# Patient Record
Sex: MALE | Race: WHITE | NOT HISPANIC OR LATINO | Employment: FULL TIME | URBAN - METROPOLITAN AREA
[De-identification: names, ages, dates, MRNs, and addresses within clinical notes are randomized per-mention and may not be internally consistent; named-entity substitution may affect disease eponyms.]

---

## 2017-09-18 LAB
ALT SERPL-CCNC: 19 U/L (ref 8–45)
AST SERPL-CCNC: 13 U/L (ref 5–41)
CHOLEST SERPL-MCNC: 298 MG/DL (ref 0–200)
CREAT SERPL-MCNC: 0.9 MG/DL (ref 0.72–1.25)
GFR SERPL CREATININE-BSD FRML MDRD: >60 ML/MIN/1.73M2
GLUCOSE SERPL-MCNC: 189 MG/DL (ref 70–100)
HBA1C MFR BLD: 7.9 % (ref 0–5.7)
HDLC SERPL-MCNC: 61 MG/DL
LDLC SERPL CALC-MCNC: 190 MG/DL (ref 0–159)
POTASSIUM SERPL-SCNC: 4.2 MMOL/L (ref 3.5–5.1)
TRIGL SERPL-MCNC: 233 MG/DL (ref 30–150)

## 2017-09-22 LAB — HEP C HIM: NORMAL

## 2017-12-20 ENCOUNTER — TRANSFERRED RECORDS (OUTPATIENT)
Dept: HEALTH INFORMATION MANAGEMENT | Facility: CLINIC | Age: 57
End: 2017-12-20

## 2017-12-20 LAB — HBA1C MFR BLD: 7.9 % (ref 0–5.7)

## 2018-08-23 ENCOUNTER — TRANSFERRED RECORDS (OUTPATIENT)
Dept: HEALTH INFORMATION MANAGEMENT | Facility: CLINIC | Age: 58
End: 2018-08-23

## 2018-08-23 LAB
CHOLEST SERPL-MCNC: 304 MG/DL (ref 0–200)
HBA1C MFR BLD: 9.8 % (ref 0–5.7)
HDLC SERPL-MCNC: 57 MG/DL
LDLC SERPL CALC-MCNC: 207 MG/DL (ref 0–159)
TRIGL SERPL-MCNC: 199 MG/DL (ref 30–150)

## 2018-08-28 ENCOUNTER — TRANSFERRED RECORDS (OUTPATIENT)
Dept: HEALTH INFORMATION MANAGEMENT | Facility: CLINIC | Age: 58
End: 2018-08-28

## 2018-12-21 ENCOUNTER — TRANSFERRED RECORDS (OUTPATIENT)
Dept: HEALTH INFORMATION MANAGEMENT | Facility: CLINIC | Age: 58
End: 2018-12-21

## 2019-01-29 ENCOUNTER — TELEPHONE (OUTPATIENT)
Dept: FAMILY MEDICINE | Facility: CLINIC | Age: 59
End: 2019-01-29

## 2019-01-29 ENCOUNTER — OFFICE VISIT (OUTPATIENT)
Dept: FAMILY MEDICINE | Facility: CLINIC | Age: 59
End: 2019-01-29
Payer: COMMERCIAL

## 2019-01-29 VITALS
HEIGHT: 69 IN | BODY MASS INDEX: 31.99 KG/M2 | WEIGHT: 216 LBS | DIASTOLIC BLOOD PRESSURE: 80 MMHG | SYSTOLIC BLOOD PRESSURE: 120 MMHG | HEART RATE: 69 BPM | TEMPERATURE: 97.2 F | OXYGEN SATURATION: 93 % | RESPIRATION RATE: 16 BRPM

## 2019-01-29 DIAGNOSIS — Z12.5 SCREENING FOR PROSTATE CANCER: ICD-10-CM

## 2019-01-29 DIAGNOSIS — I15.9 SECONDARY HYPERTENSION: ICD-10-CM

## 2019-01-29 DIAGNOSIS — E11.9 TYPE 2 DIABETES MELLITUS WITHOUT COMPLICATION, UNSPECIFIED WHETHER LONG TERM INSULIN USE (H): ICD-10-CM

## 2019-01-29 DIAGNOSIS — L40.9 PSORIASIS: ICD-10-CM

## 2019-01-29 DIAGNOSIS — Z23 NEED FOR PROPHYLACTIC VACCINATION AND INOCULATION AGAINST INFLUENZA: ICD-10-CM

## 2019-01-29 DIAGNOSIS — E78.5 HYPERLIPIDEMIA, UNSPECIFIED HYPERLIPIDEMIA TYPE: ICD-10-CM

## 2019-01-29 DIAGNOSIS — E11.9 TYPE 2 DIABETES MELLITUS WITHOUT COMPLICATION, WITHOUT LONG-TERM CURRENT USE OF INSULIN (H): Primary | ICD-10-CM

## 2019-01-29 DIAGNOSIS — E29.1 HYPOGONADISM MALE: ICD-10-CM

## 2019-01-29 LAB
ALBUMIN SERPL-MCNC: 3.9 G/DL (ref 3.4–5)
ALP SERPL-CCNC: 139 U/L (ref 40–150)
ALT SERPL W P-5'-P-CCNC: 77 U/L (ref 0–70)
ANION GAP SERPL CALCULATED.3IONS-SCNC: 6 MMOL/L (ref 3–14)
AST SERPL W P-5'-P-CCNC: 32 U/L (ref 0–45)
BILIRUB SERPL-MCNC: 0.9 MG/DL (ref 0.2–1.3)
BUN SERPL-MCNC: 13 MG/DL (ref 7–30)
CALCIUM SERPL-MCNC: 8.8 MG/DL (ref 8.5–10.1)
CHLORIDE SERPL-SCNC: 98 MMOL/L (ref 94–109)
CHOLEST SERPL-MCNC: 190 MG/DL
CO2 SERPL-SCNC: 29 MMOL/L (ref 20–32)
CREAT SERPL-MCNC: 0.75 MG/DL (ref 0.66–1.25)
GFR SERPL CREATININE-BSD FRML MDRD: >90 ML/MIN/{1.73_M2}
GLUCOSE SERPL-MCNC: 358 MG/DL (ref 70–99)
HBA1C MFR BLD: 13.6 % (ref 0–5.6)
HDLC SERPL-MCNC: 44 MG/DL
LDLC SERPL CALC-MCNC: 99 MG/DL
NONHDLC SERPL-MCNC: 146 MG/DL
POTASSIUM SERPL-SCNC: 4.3 MMOL/L (ref 3.4–5.3)
PROT SERPL-MCNC: 8.1 G/DL (ref 6.8–8.8)
PSA SERPL-ACNC: 0.37 UG/L (ref 0–4)
SODIUM SERPL-SCNC: 133 MMOL/L (ref 133–144)
TRIGL SERPL-MCNC: 234 MG/DL
TSH SERPL DL<=0.005 MIU/L-ACNC: 2.78 MU/L (ref 0.4–4)

## 2019-01-29 PROCEDURE — 83036 HEMOGLOBIN GLYCOSYLATED A1C: CPT | Performed by: NURSE PRACTITIONER

## 2019-01-29 PROCEDURE — G0103 PSA SCREENING: HCPCS | Performed by: NURSE PRACTITIONER

## 2019-01-29 PROCEDURE — 90471 IMMUNIZATION ADMIN: CPT | Performed by: NURSE PRACTITIONER

## 2019-01-29 PROCEDURE — 84270 ASSAY OF SEX HORMONE GLOBUL: CPT | Performed by: NURSE PRACTITIONER

## 2019-01-29 PROCEDURE — 84403 ASSAY OF TOTAL TESTOSTERONE: CPT | Performed by: NURSE PRACTITIONER

## 2019-01-29 PROCEDURE — 36415 COLL VENOUS BLD VENIPUNCTURE: CPT | Performed by: NURSE PRACTITIONER

## 2019-01-29 PROCEDURE — 80053 COMPREHEN METABOLIC PANEL: CPT | Performed by: NURSE PRACTITIONER

## 2019-01-29 PROCEDURE — 99204 OFFICE O/P NEW MOD 45 MIN: CPT | Mod: 25 | Performed by: NURSE PRACTITIONER

## 2019-01-29 PROCEDURE — 90682 RIV4 VACC RECOMBINANT DNA IM: CPT | Performed by: NURSE PRACTITIONER

## 2019-01-29 PROCEDURE — 84443 ASSAY THYROID STIM HORMONE: CPT | Performed by: NURSE PRACTITIONER

## 2019-01-29 PROCEDURE — 80061 LIPID PANEL: CPT | Performed by: NURSE PRACTITIONER

## 2019-01-29 RX ORDER — CLOBETASOL PROPIONATE 0.5 MG/ML
SOLUTION TOPICAL 2 TIMES DAILY
Qty: 50 ML | Refills: 3 | Status: SHIPPED | OUTPATIENT
Start: 2019-01-29

## 2019-01-29 RX ORDER — CLOBETASOL PROPIONATE 0.5 MG/G
OINTMENT TOPICAL
COMMUNITY
Start: 2017-09-21 | End: 2019-05-01

## 2019-01-29 RX ORDER — CLOBETASOL PROPIONATE 0.5 MG/ML
SOLUTION TOPICAL
COMMUNITY
Start: 2018-08-23 | End: 2019-01-29

## 2019-01-29 RX ORDER — ATORVASTATIN CALCIUM 80 MG/1
80 TABLET, FILM COATED ORAL DAILY
Refills: 0 | COMMUNITY
Start: 2019-01-27 | End: 2019-07-23

## 2019-01-29 RX ORDER — AMLODIPINE BESYLATE 5 MG/1
5 TABLET ORAL DAILY
Refills: 1 | COMMUNITY
Start: 2019-01-02 | End: 2019-06-29

## 2019-01-29 ASSESSMENT — MIFFLIN-ST. JEOR: SCORE: 1780.15

## 2019-01-29 NOTE — PATIENT INSTRUCTIONS
Call your insurance to see about shingles shot.  Will be notified of pending labs.  Follow up with endocrinology.  Continue with same medication.  Flu shot given today.  Follow up in 3 months.    Our Clinic hours are:  Mondays    7:20 am - 7 pm  Tues -  Fri  7:20 am - 5 pm    Clinic Phone: [phone] Call and ask for Jaqueline Culp's care team at the [facility] once to the care team ask to speak to Jaqueline Chester's medical assistant of leave a message for her.    The clinic lab opens at 7:30 am Mon - Fri and appointments are required.    [facility] [place]  Ph. [phone]  Monday  8 am - 7pm  Tues - Fri 8 am - 5:30 pm

## 2019-01-29 NOTE — PROGRESS NOTES
SUBJECTIVE:   Remigio Almaraz is a 58 year old male who presents to clinic today for the following health issues:      New Patient/Transfer of Care  Diabetes Follow-up      Patient is checking blood sugars: not at all    Diabetic concerns: None     Symptoms of hypoglycemia (low blood sugar): none     Paresthesias (numbness or burning in feet) or sores: No     Date of last diabetic eye exam: 2 years ago    BP Readings from Last 2 Encounters:   01/29/19 120/80     No results found for: A1C, LDL    Diabetes Management Resources    Amount of exercise or physical activity: None    Problems taking medications regularly: No    Medication side effects: none    Diet: regular (no restrictions)            Problem list and histories reviewed & adjusted, as indicated.  Additional history: new patient to this clinic, he had been living in [place], [place] and most recently in [place].  He has a history of diabetes and doesn't seem to remember exactly what the dose of his metformin is he's been taking. Denies any concerns with his diabetes.  States he had a colonoscopy while in [place] and it was about 5 years ago and it was normal.  Reports hypogonadism and had been on weekly shots of testosterone. He would like a referral to restart that if needed.   Blood pressure is controlled. History of hyperlipidemia.  He denies any acute health concerns today.  Will get flu shot.      Patient Active Problem List   Diagnosis     Type II diabetes mellitus (H)     Psoriasis     Hypogonadism male     Hypertension     Hyperlipidemia     Past Surgical History:   Procedure Laterality Date     HERNIA REPAIR, UMBILICAL         Social History     Tobacco Use     Smoking status: Never Smoker     Smokeless tobacco: Never Used   Substance Use Topics     Alcohol use: Yes     Comment: 3 per week     Family History   Problem Relation Age of Onset     Breast Cancer Mother      Coronary Artery Disease Father         virus in heart     Coronary Artery Disease  "Sister      Breast Cancer Sister          Current Outpatient Medications   Medication Sig Dispense Refill     amLODIPine (NORVASC) 5 MG tablet Take 5 mg by mouth daily  1     atorvastatin (LIPITOR) 80 MG tablet Take 80 mg by mouth daily  0     clobetasol (TEMOVATE) 0.05 % external ointment        clobetasol (TEMOVATE) 0.05 % external solution Apply topically 2 times daily 50 mL 3     metFORMIN (GLUCOPHAGE) 1000 MG tablet Take 1,000 mg by mouth daily (with breakfast)  0     No Known Allergies  Recent Labs   Lab Test 01/29/19  0938   A1C 13.6*      BP Readings from Last 3 Encounters:   01/29/19 120/80    Wt Readings from Last 3 Encounters:   01/29/19 98 kg (216 lb)                    Reviewed and updated as needed this visit by clinical staff  Tobacco  Allergies  Meds  Med Hx  Surg Hx  Fam Hx  Soc Hx      Reviewed and updated as needed this visit by Provider          ROS: 10 point ROS neg other than the symptoms noted above in the HPI.    OBJECTIVE:     /80 (BP Location: Right arm, Patient Position: Chair, Cuff Size: Adult Large)   Pulse 69   Temp 97.2  F (36.2  C) (Oral)   Resp 16   Ht 1.753 m (5' 9\")   Wt 98 kg (216 lb)   SpO2 93%   BMI 31.90 kg/m    Body mass index is 31.9 kg/m .  GENERAL: healthy, alert and no distress  HENT: ear canals and TM's normal, pharynx without erythema  NECK: no adenopathy, no asymmetry  RESP: lungs clear to auscultation - no rales, rhonchi or wheezes  CV: regular rate and rhythm, normal S1 S2, no S3 or S4, no murmur  ABDOMEN: soft, nontender, no hepatosplenomegaly, no masses and bowel sounds normal  MS: no gross musculoskeletal defects noted      Diagnostic Test Results:  Results for orders placed or performed in visit on 01/29/19 (from the past 24 hour(s))   Hemoglobin A1c   Result Value Ref Range    Hemoglobin A1C 13.6 (H) 0 - 5.6 %       ASSESSMENT/PLAN:             1. Type 2 diabetes mellitus without complication, without long-term current use of insulin (H)      - " Hemoglobin A1c  - DIABETES EDUCATOR REFERRAL  Not well controlled, to diabetes education and he will clarify dose of metformin.        3. Psoriasis  - clobetasol (TEMOVATE) 0.05 % external solution; Apply topically 2 times daily  Dispense: 50 mL; Refill: 3  Stable, refilled his solution.    4. Hypogonadism male    - **Testosterone Free and Total FUTURE anytime  - ENDOCRINOLOGY ADULT REFERRAL    5. Secondary hypertension    - Comprehensive metabolic panel  - TSH with free T4 reflex    6. Hyperlipidemia, unspecified hyperlipidemia type    - Comprehensive metabolic panel  - Lipid panel reflex to direct LDL Fasting    7. Screening for prostate cancer    - PSA, screen    8. Need for prophylactic vaccination and inoculation against influenza    - FLU VACCINE, (RIV4) RECOMBINANT HA  , IM (FluBlok, egg free) [75141]- >18 YRS (FMG recommended  50-64 YRS)  - Vaccine Administration, Initial [31093]    See Patient Instructions  Patient Instructions   Call your insurance to see about shingles shot.  Will be notified of pending labs.  Follow up with endocrinology.  Continue with same medication.  Flu shot given today.  Follow up in 3 months.    Our Clinic hours are:  Mondays    7:20 am - 7 pm  Tues -  Fri  7:20 am - 5 pm    Clinic Phone: [phone] Call and ask for Jaqueline Culp's care team at the [facility] once to the care team ask to speak to Jaqueline Chester's medical assistant of leave a message for her.    The clinic lab opens at 7:30 am Mon - Fri and appointments are required.    [facility] [place]  Ph. [phone]  Monday  8 am - 7pm  Tues - Fri 8 am - 5:30 pm             [facility]    Injectable Influenza Immunization Documentation    1.  Is the person to be vaccinated sick today?   No    2. Does the person to be vaccinated have an allergy to a component   of the vaccine?   No  Egg Allergy Algorithm Link    3. Has the person to be vaccinated ever had a  serious reaction   to influenza vaccine in the past?   No    4. Has the person to be vaccinated ever had Guillain-Barré syndrome?   No    Form completed by

## 2019-01-31 LAB
SHBG SERPL-SCNC: 18 NMOL/L (ref 11–80)
TESTOST FREE SERPL-MCNC: 7.53 NG/DL (ref 4.7–24.4)
TESTOST SERPL-MCNC: 284 NG/DL (ref 240–950)

## 2019-05-01 ENCOUNTER — TELEPHONE (OUTPATIENT)
Dept: FAMILY MEDICINE | Facility: CLINIC | Age: 59
End: 2019-05-01

## 2019-05-01 ENCOUNTER — ALLIED HEALTH/NURSE VISIT (OUTPATIENT)
Dept: FAMILY MEDICINE | Facility: CLINIC | Age: 59
End: 2019-05-01
Payer: COMMERCIAL

## 2019-05-01 DIAGNOSIS — L40.9 PSORIASIS: Primary | ICD-10-CM

## 2019-05-01 PROCEDURE — 99207 ZZC NO CHARGE NURSE ONLY: CPT

## 2019-05-01 RX ORDER — CLOBETASOL PROPIONATE 0.5 MG/G
OINTMENT TOPICAL 2 TIMES DAILY
Qty: 30 G | Status: CANCELLED | OUTPATIENT
Start: 2019-05-01

## 2019-05-01 RX ORDER — CLOBETASOL PROPIONATE 0.5 MG/G
OINTMENT TOPICAL 2 TIMES DAILY
Qty: 30 G | Refills: 3 | Status: SHIPPED | OUTPATIENT
Start: 2019-05-01 | End: 2020-06-24

## 2019-05-01 NOTE — TELEPHONE ENCOUNTER
Patient walked into clinic and would like a refill for one of the medications that was prescribed by his old doctor and would also like a referral for dermatology for psoriasis.  Patient used to get shots for this and would like to discuss that with them.      Routing refill request to provider for review/approval because:  Medication is reported/historical    Thank you    Emma COOPER RN

## 2019-05-08 ENCOUNTER — OFFICE VISIT (OUTPATIENT)
Dept: DERMATOLOGY | Facility: CLINIC | Age: 59
End: 2019-05-08
Payer: COMMERCIAL

## 2019-05-08 ENCOUNTER — TELEPHONE (OUTPATIENT)
Dept: DERMATOLOGY | Facility: CLINIC | Age: 59
End: 2019-05-08

## 2019-05-08 VITALS — DIASTOLIC BLOOD PRESSURE: 88 MMHG | TEMPERATURE: 97.3 F | SYSTOLIC BLOOD PRESSURE: 133 MMHG | HEART RATE: 66 BPM

## 2019-05-08 DIAGNOSIS — L81.4 LENTIGO: ICD-10-CM

## 2019-05-08 DIAGNOSIS — L40.9 PSORIASIS: Primary | ICD-10-CM

## 2019-05-08 DIAGNOSIS — L82.1 SEBORRHEIC KERATOSIS: ICD-10-CM

## 2019-05-08 DIAGNOSIS — D18.00 ANGIOMA: ICD-10-CM

## 2019-05-08 DIAGNOSIS — D04.30 SQUAMOUS CELL CARCINOMA IN SITU (SCCIS) OF SKIN OF FACE: ICD-10-CM

## 2019-05-08 PROCEDURE — 11900 INJECT SKIN LESIONS </W 7: CPT | Performed by: DERMATOLOGY

## 2019-05-08 PROCEDURE — 11102 TANGNTL BX SKIN SINGLE LES: CPT | Performed by: DERMATOLOGY

## 2019-05-08 PROCEDURE — 99203 OFFICE O/P NEW LOW 30 MIN: CPT | Mod: 25 | Performed by: DERMATOLOGY

## 2019-05-08 PROCEDURE — 88331 PATH CONSLTJ SURG 1 BLK 1SPC: CPT | Performed by: DERMATOLOGY

## 2019-05-08 NOTE — LETTER
[facility]  [address] [address]  Phone: [phone]       May 8, 2019         Remigio Almaraz  [address]  [facility] [address]            Dear Remigio    You are scheduled for Mohs Surgery on   May 21st at 7:45 am    We are located at the [facility] in [place]. Please check in at the Dermatology Clinic located on the 2nd floor at the end of the hernandes.    You don't need to arrive more than 5-10 minutes prior to your appointment time.    Be sure to eat a good breakfast and bathe and wash your hair prior to Surgery.   If you are taking any anti-coagulants that are prescribed by your Doctor (such as Coumadin/warfarin, Plavix, Aspirin, Ibuprofen), please continue taking them.    However, If you are taking anti-coagulants over the counter without a Doctor's order for a Medical condition, please discontinue them 10 days prior to Surgery.      Please wear comfortable clothing as you could possibly be in the clinic for 4-6 hours for your surgery.    Tonio Wesley [phone]

## 2019-05-08 NOTE — PATIENT INSTRUCTIONS
Wound Care Instructions     FOR SUPERFICIAL WOUNDS     [facility] [phone]    [facility] [phone]          Right temple    AFTER 24 HOURS YOU SHOULD REMOVE THE BANDAGE AND BEGIN DAILY DRESSING CHANGES AS FOLLOWS:     1) Remove Dressing.     2) Clean and dry the area with tap water using a Q-tip or sterile gauze pad.     3) Apply Vaseline, Aquaphor, Polysporin ointment or Bacitracin ointment over entire wound.  Do NOT use Neosporin ointment.     4) Cover the wound with a band-aid, or a sterile non-stick gauze pad and micropore paper tape      REPEAT THESE INSTRUCTIONS AT LEAST ONCE A DAY UNTIL THE WOUND HAS COMPLETELY HEALED.    It is an old wives tale that a wound heals better when it is exposed to air and allowed to dry out. The wound will heal faster with a better cosmetic result if it is kept moist with ointment and covered with a bandage.    **Do not let the wound dry out.**      Supplies Needed:      *Cotton tipped applicators (Q-tips)    *Polysporin Ointment or Bacitracin Ointment (NOT NEOSPORIN)    *Band-aids or non-stick gauze pads and micropore paper tape.      PATIENT INFORMATION:    During the healing process you will notice a number of changes. All wounds develop a small halo of redness surrounding the wound.  This means healing is occurring. Severe itching with extensive redness usually indicates sensitivity to the ointment or bandage tape used to dress the wound.  You should call our office if this develops.      Swelling  and/or discoloration around your surgical site is common, particularly when performed around the eye.    All wounds normally drain.  The larger the wound the more drainage there will be.  After 7-10 days, you will notice the wound beginning to shrink and new skin will begin to grow.  The wound is healed when you can see skin has formed over the entire area.  A healed wound has a healthy, shiny look to the surface and is red to dark pink in color to  normalize.  Wounds may take approximately 4-6 weeks to heal.  Larger wounds may take 6-8 weeks.  After the wound is healed you may discontinue dressing changes.    You may experience a sensation of tightness as your wound heals. This is normal and will gradually subside.    Your healed wound may be sensitive to temperature changes. This sensitivity improves with time, but if you re having a lot of discomfort, try to avoid temperature extremes.    Patients frequently experience itching after their wound appears to have healed because of the continue healing under the skin.  Plain Vaseline will help relieve the itching.        POSSIBLE COMPLICATIONS    BLEEDIN. Leave the bandage in place.  2. Use tightly rolled up gauze or a cloth to apply direct pressure over the bandage for 30  minutes.  3. Reapply pressure for an additional 30 minutes if necessary  4. Use additional gauze and tape to maintain pressure once the bleeding has stopped.

## 2019-05-08 NOTE — PROGRESS NOTES
Remigio Almaraz is a 58 year old year old male patient here today for bleeding psot on face.  He also notes hx of psorias, uses topicals p[rn, he also would like il tac for spots on elbows an dknees.   .  Patient states this has been present for a while on face.  .  Patient reports the following symptoms:  bleeding.  Patient reports the following previous treatments cryo .  Patient reports the following modifying factors none.  Associated symptoms: none.  Patient has no other skin complaints today.  Remainder of the HPI, Meds, PMH, Allergies, FH, and SH was reviewed in chart.      Past Medical History:   Diagnosis Date     Hyperlipidemia      Hypertension      Hypogonadism male      Psoriasis      Type II diabetes mellitus (H)        Past Surgical History:   Procedure Laterality Date     HERNIA REPAIR, UMBILICAL          Family History   Problem Relation Age of Onset     Breast Cancer Mother      Coronary Artery Disease Father         virus in heart     Coronary Artery Disease Sister      Breast Cancer Sister        Social History     Socioeconomic History     Marital status: Single     Spouse name: Not on file     Number of children: Not on file     Years of education: Not on file     Highest education level: Not on file   Occupational History     Not on file   Social Needs     Financial resource strain: Not on file     Food insecurity:     Worry: Not on file     Inability: Not on file     Transportation needs:     Medical: Not on file     Non-medical: Not on file   Tobacco Use     Smoking status: Never Smoker     Smokeless tobacco: Never Used   Substance and Sexual Activity     Alcohol use: Yes     Comment: 3 per week     Drug use: No     Sexual activity: Not Currently     Partners: Female   Lifestyle     Physical activity:     Days per week: Not on file     Minutes per session: Not on file     Stress: Not on file   Relationships     Social connections:     Talks on phone: Not on file     Gets together: Not on file      Attends Protestant service: Not on file     Active member of club or organization: Not on file     Attends meetings of clubs or organizations: Not on file     Relationship status: Not on file     Intimate partner violence:     Fear of current or ex partner: Not on file     Emotionally abused: Not on file     Physically abused: Not on file     Forced sexual activity: Not on file   Other Topics Concern     Not on file   Social History Narrative     Not on file       Outpatient Encounter Medications as of 5/8/2019   Medication Sig Dispense Refill     amLODIPine (NORVASC) 5 MG tablet Take 5 mg by mouth daily  1     atorvastatin (LIPITOR) 80 MG tablet Take 80 mg by mouth daily  0     clobetasol (TEMOVATE) 0.05 % external ointment Apply topically 2 times daily 30 g 3     clobetasol (TEMOVATE) 0.05 % external solution Apply topically 2 times daily 50 mL 3     metFORMIN (GLUCOPHAGE) 1000 MG tablet Take 1,000 mg by mouth daily (with breakfast)  0     No facility-administered encounter medications on file as of 5/8/2019.              Review Of Systems  Skin: As above  Eyes: negative  Ears/Nose/Throat: negative  Respiratory: No shortness of breath, dyspnea on exertion, cough, or hemoptysis  Cardiovascular: negative  Gastrointestinal: negative  Genitourinary: negative  Musculoskeletal: negative  Neurologic: negative  Psychiatric: negative  Hematologic/Lymphatic/Immunologic: negative  Endocrine: negative      O:   NAD, WDWN, Alert & Oriented, Mood & Affect wnl, Vitals stable   Here today alone   /88   Pulse 66   Temp 97.3  F (36.3  C) (Tympanic)    General appearance normal   Vitals stable   Alert, oriented and in no acute distress      Following lymph nodes palpated: Occipital, Cervical, Supraclavicular no lad   R sideburn bleeding ill-deifned 1cm scaly papule    Stuck on papules and brown macules on trunk and ext   Red papules on trunk  Red scaly plaque son elbows and knees     The remainder of expanded problem focused  exam was unremarkable; the following areas were examined:  scalp/hair, conjunctiva/lids, face, neck, lips, chest, digits/nails, RUE, LUE.      Eyes: Conjunctivae/lids:Normal     ENT: Lips, buccal mucosa, tongue: normal    MSK:Normal    Cardiovascular: peripheral edema none    Pulm: Breathing Normal    Lymph Nodes: No Head and Neck Lymphadenopathy     Neuro/Psych: Orientation:Normal; Mood/Affect:Normal      MICRO:   R sideburn:There is hyperkeratosis & parakeratosis of the epidermis, with full thickness epidermal involvement by atypical keratinocytes with rare pale vacuolated cells.  Unremarkable dermis.    A/P:  1. Seborrheic keratosis, lentigo, angioma,   2. R sideburn r.o squamous cell carcinoma   TANGENTIAL BIOPSY IN HOUSE:  After consent, anesthesia with LEC and prep, tangential excision performed and dx above confirmed with frozen section histology.  No complications and routine wound care.  Patient told result squamous cell carcinoma in situ schedule excison  3. .  Psoriasis  Treatment options discussed with patient   Risk sof il tac   IL TAC: PGACAC discussed.  Risks including but not limited to injection site reaction, bruising, no resolution.  All questions answered and entertained to patient s satisfaction.  Informed consent obtained.  IL TAC in concentration of 10mg/ml was injected ID to L knee, R knee, R elbow, L elbow.  Total injected was  2 ml.  Patient tolerated without complications and given wound care instructions, including not to move product around.  Return in 4 weeks for follow-up and possible additional IL TAC.    [id number]  9/2020    BENIGN LESIONS DISCUSSED WITH PATIENT:  I discussed the specifics of tumor, prognosis, and genetics of benign lesions.  I explained that treatment of these lesions would be purely cosmetic and not medically neccessary.  I discussed with patient different removal options including excision, cautery and /or laser.      Nature and genetics of benign skin lesions  dicussed with patient.  Signs and Symptoms of skin cancer discussed with patient.  Patient encouraged to perform monthly skin exams.  UV precautions reviewed with patient.  Patient to follow up with Primary Care provider regarding elevated blood pressure.  Skin care regimen reviewed with patient: Eliminate harsh soaps, i.e. Dial, zest, irsih spring; Mild soaps such as Cetaphil or Dove sensitive skin, avoid hot or cold showers, aggressive use of emollients including vanicream, cetaphil or cerave discussed with patient.    Risks of non-melanoma skin cancer discussed with patient   Return to clinic 6 months  Remigio to follow up with Primary Care provider regarding elevated blood pressure.

## 2019-05-08 NOTE — TELEPHONE ENCOUNTER
----- Message from Tonio Wesley MD sent at 5/8/2019  1:31 PM CDT -----  Right sidburn squamous cell carcinoma in situ schedule excision

## 2019-05-08 NOTE — TELEPHONE ENCOUNTER
Spoke with patient and reviewed results. Pt verbalized understanding. [facility] appt made and info packet mailed.  Hodan LUO RN BSN PHN  Specialty Clinics

## 2019-05-08 NOTE — LETTER
5/8/2019         RE: Remigio Almaraz  [address]  [facility] [address]        Dear Colleague,    Thank you for referring your patient, Remigio Almaraz, to the [facility]. Please see a copy of my visit note below.    Remigio Almaraz is a 58 year old year old male patient here today for bleeding psot on face.  He also notes hx of psorias, uses topicals p[rn, he also would like il tac for spots on elbows an dknees.   .  Patient states this has been present for a while on face.  .  Patient reports the following symptoms:  bleeding.  Patient reports the following previous treatments cryo .  Patient reports the following modifying factors none.  Associated symptoms: none.  Patient has no other skin complaints today.  Remainder of the HPI, Meds, PMH, Allergies, FH, and SH was reviewed in chart.      Past Medical History:   Diagnosis Date     Hyperlipidemia      Hypertension      Hypogonadism male      Psoriasis      Type II diabetes mellitus (H)        Past Surgical History:   Procedure Laterality Date     HERNIA REPAIR, UMBILICAL          Family History   Problem Relation Age of Onset     Breast Cancer Mother      Coronary Artery Disease Father         virus in heart     Coronary Artery Disease Sister      Breast Cancer Sister        Social History     Socioeconomic History     Marital status: Single     Spouse name: Not on file     Number of children: Not on file     Years of education: Not on file     Highest education level: Not on file   Occupational History     Not on file   Social Needs     Financial resource strain: Not on file     Food insecurity:     Worry: Not on file     Inability: Not on file     Transportation needs:     Medical: Not on file     Non-medical: Not on file   Tobacco Use     Smoking status: Never Smoker     Smokeless tobacco: Never Used   Substance and Sexual Activity     Alcohol use: Yes     Comment: 3 per week     Drug use: No     Sexual activity: Not Currently     Partners: Female    Lifestyle     Physical activity:     Days per week: Not on file     Minutes per session: Not on file     Stress: Not on file   Relationships     Social connections:     Talks on phone: Not on file     Gets together: Not on file     Attends Mandaeism service: Not on file     Active member of club or organization: Not on file     Attends meetings of clubs or organizations: Not on file     Relationship status: Not on file     Intimate partner violence:     Fear of current or ex partner: Not on file     Emotionally abused: Not on file     Physically abused: Not on file     Forced sexual activity: Not on file   Other Topics Concern     Not on file   Social History Narrative     Not on file       Outpatient Encounter Medications as of 5/8/2019   Medication Sig Dispense Refill     amLODIPine (NORVASC) 5 MG tablet Take 5 mg by mouth daily  1     atorvastatin (LIPITOR) 80 MG tablet Take 80 mg by mouth daily  0     clobetasol (TEMOVATE) 0.05 % external ointment Apply topically 2 times daily 30 g 3     clobetasol (TEMOVATE) 0.05 % external solution Apply topically 2 times daily 50 mL 3     metFORMIN (GLUCOPHAGE) 1000 MG tablet Take 1,000 mg by mouth daily (with breakfast)  0     No facility-administered encounter medications on file as of 5/8/2019.              Review Of Systems  Skin: As above  Eyes: negative  Ears/Nose/Throat: negative  Respiratory: No shortness of breath, dyspnea on exertion, cough, or hemoptysis  Cardiovascular: negative  Gastrointestinal: negative  Genitourinary: negative  Musculoskeletal: negative  Neurologic: negative  Psychiatric: negative  Hematologic/Lymphatic/Immunologic: negative  Endocrine: negative      O:   NAD, WDWN, Alert & Oriented, Mood & Affect wnl, Vitals stable   Here today alone   /88   Pulse 66   Temp 97.3  F (36.3  C) (Tympanic)    General appearance normal   Vitals stable   Alert, oriented and in no acute distress      Following lymph nodes palpated: Occipital, Cervical,  Supraclavicular no lad   R sideburn bleeding ill-deifned 1cm scaly papule    Stuck on papules and brown macules on trunk and ext   Red papules on trunk  Red scaly plaque son elbows and knees     The remainder of expanded problem focused exam was unremarkable; the following areas were examined:  scalp/hair, conjunctiva/lids, face, neck, lips, chest, digits/nails, RUE, LUE.      Eyes: Conjunctivae/lids:Normal     ENT: Lips, buccal mucosa, tongue: normal    MSK:Normal    Cardiovascular: peripheral edema none    Pulm: Breathing Normal    Lymph Nodes: No Head and Neck Lymphadenopathy     Neuro/Psych: Orientation:Normal; Mood/Affect:Normal      MICRO:   R sideburn:There is hyperkeratosis & parakeratosis of the epidermis, with full thickness epidermal involvement by atypical keratinocytes with rare pale vacuolated cells.  Unremarkable dermis.    A/P:  1. Seborrheic keratosis, lentigo, angioma,   2. R sideburn r.o squamous cell carcinoma   TANGENTIAL BIOPSY IN HOUSE:  After consent, anesthesia with [facility] and prep, tangential excision performed and dx above confirmed with frozen section histology.  No complications and routine wound care.  Patient told result squamous cell carcinoma in situ schedule excison  3. .  Psoriasis  Treatment options discussed with patient   Risk sof il tac   IL TAC: PGACAC discussed.  Risks including but not limited to injection site reaction, bruising, no resolution.  All questions answered and entertained to patient s satisfaction.  Informed consent obtained.  IL TAC in concentration of 10mg/ml was injected ID to L knee, R knee, R elbow, L elbow.  Total injected was  2 ml.  Patient tolerated without complications and given wound care instructions, including not to move product around.  Return in 4 weeks for follow-up and possible additional IL TAC.    [id number]  9/2020    BENIGN LESIONS DISCUSSED WITH PATIENT:  I discussed the specifics of tumor, prognosis, and genetics of benign lesions.  I  explained that treatment of these lesions would be purely cosmetic and not medically neccessary.  I discussed with patient different removal options including excision, cautery and /or laser.      Nature and genetics of benign skin lesions dicussed with patient.  Signs and Symptoms of skin cancer discussed with patient.  Patient encouraged to perform monthly skin exams.  UV precautions reviewed with patient.  Patient to follow up with Primary Care provider regarding elevated blood pressure.  Skin care regimen reviewed with patient: Eliminate harsh soaps, i.e. Dial, zest, irsih spring; Mild soaps such as Cetaphil or Dove sensitive skin, avoid hot or cold showers, aggressive use of emollients including vanicream, cetaphil or cerave discussed with patient.    Risks of non-melanoma skin cancer discussed with patient   Return to clinic 6 months  Remigio to follow up with Primary Care provider regarding elevated blood pressure.      Again, thank you for allowing me to participate in the care of your patient.        Sincerely,        Tonio Wesley MD

## 2019-05-21 ENCOUNTER — OFFICE VISIT (OUTPATIENT)
Dept: DERMATOLOGY | Facility: CLINIC | Age: 59
End: 2019-05-21
Payer: COMMERCIAL

## 2019-05-21 VITALS — DIASTOLIC BLOOD PRESSURE: 99 MMHG | HEART RATE: 66 BPM | OXYGEN SATURATION: 95 % | SYSTOLIC BLOOD PRESSURE: 128 MMHG

## 2019-05-21 DIAGNOSIS — D04.30 SQUAMOUS CELL CARCINOMA IN SITU OF SKIN OF FACE: Primary | ICD-10-CM

## 2019-05-21 DIAGNOSIS — C44.320 SQUAMOUS CELL CARCINOMA OF FACE: ICD-10-CM

## 2019-05-21 PROCEDURE — 17311 MOHS 1 STAGE H/N/HF/G: CPT | Performed by: DERMATOLOGY

## 2019-05-21 PROCEDURE — 17312 MOHS ADDL STAGE: CPT | Performed by: DERMATOLOGY

## 2019-05-21 NOTE — LETTER
5/21/2019         RE: Remigio Almaraz  [address]  [facility] [address]        Dear Colleague,    Thank you for referring your patient, Remigio Almaraz, to the [facility]. Please see a copy of my visit note below.    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   .  Patient states this has been present for a while.  Patient reports the following symptoms:  Not healing.  Patient reports the following previous treatments cryo .  Patient reports the following modifying factors none.  Associated symptoms: none.  Patient has no other skin complaints today.  Remainder of the HPI, Meds, PMH, Allergies, FH, and SH was reviewed in chart.      Past Medical History:   Diagnosis Date     Hyperlipidemia      Hypertension      Hypogonadism male      Psoriasis      Squamous cell carcinoma      Type II diabetes mellitus (H)        Past Surgical History:   Procedure Laterality Date     HERNIA REPAIR, UMBILICAL          Family History   Problem Relation Age of Onset     Breast Cancer Mother      Coronary Artery Disease Father         virus in heart     Coronary Artery Disease Sister      Breast Cancer Sister      Melanoma No family hx of        Social History     Socioeconomic History     Marital status: Single     Spouse name: Not on file     Number of children: Not on file     Years of education: Not on file     Highest education level: Not on file   Occupational History     Not on file   Social Needs     Financial resource strain: Not on file     Food insecurity:     Worry: Not on file     Inability: Not on file     Transportation needs:     Medical: Not on file     Non-medical: Not on file   Tobacco Use     Smoking status: Never Smoker     Smokeless tobacco: Never Used   Substance and Sexual Activity     Alcohol use: Yes      Comment: 3 per week     Drug use: No     Sexual activity: Not Currently     Partners: Female   Lifestyle     Physical activity:     Days per week: Not on file     Minutes per session: Not on file     Stress: Not on file   Relationships     Social connections:     Talks on phone: Not on file     Gets together: Not on file     Attends Shinto service: Not on file     Active member of club or organization: Not on file     Attends meetings of clubs or organizations: Not on file     Relationship status: Not on file     Intimate partner violence:     Fear of current or ex partner: Not on file     Emotionally abused: Not on file     Physically abused: Not on file     Forced sexual activity: Not on file   Other Topics Concern     Not on file   Social History Narrative     Not on file       Outpatient Encounter Medications as of 5/21/2019   Medication Sig Dispense Refill     amLODIPine (NORVASC) 5 MG tablet Take 5 mg by mouth daily  1     atorvastatin (LIPITOR) 80 MG tablet Take 80 mg by mouth daily  0     clobetasol (TEMOVATE) 0.05 % external ointment Apply topically 2 times daily 30 g 3     clobetasol (TEMOVATE) 0.05 % external solution Apply topically 2 times daily 50 mL 3     metFORMIN (GLUCOPHAGE) 1000 MG tablet Take 1,000 mg by mouth daily (with breakfast)  0     No facility-administered encounter medications on file as of 5/21/2019.              Review Of Systems  Skin: As above  Eyes: negative  Ears/Nose/Throat: negative  Respiratory: No shortness of breath, dyspnea on exertion, cough, or hemoptysis  Cardiovascular: negative  Gastrointestinal: negative  Genitourinary: negative  Musculoskeletal: negative  Neurologic: negative  Psychiatric: negative  Hematologic/Lymphatic/Immunologic: negative  Endocrine: negative      O:   NAD, WDWN, Alert & Oriented, Mood & Affect wnl, Vitals stable   Here today alone   BP (!) 128/99   Pulse 66   SpO2 95%    General appearance normal   Vitals stable   Alert, oriented and in no  acute distress      Following lymph nodes palpated: Occipital, Cervical, Supraclavicular no lad   R sideburn ill-deifned 1cm red scaly plaque        Eyes: Conjunctivae/lids:Normal     ENT: Lips, buccal mucosa, tongue: normal    MSK:Normal    Cardiovascular: peripheral edema none    Pulm: Breathing Normal    Lymph Nodes: No Head and Neck Lymphadenopathy     Neuro/Psych: Orientation:Normal; Mood/Affect:Normal      A/P:  1. R sideburn squamous cell carcinoma in situ  MOHS:   Location    After PGACAC discussed with patient, decision for Mohs surgery was made. Indication for Mohs was Location. Patient confirmed the site with Dr. Wesley.  After anesthesia with [facility], the tumor was excised using standard Mohs technique in 2 stages(s).  CLEAR MARGINS OBTAINED and Final defect size was 1.5 x 1.7 cm.     Squamous cell carcinoma on first stage  /REPAIR SECOND INTENT: We discussed the options for wound management in full with the patient including risks/benefits/possible outcomes. Decision made to allow the wound to heal by second intention. EBL minimal; complications none; wound care routine.  The patient was discharged in good condition and will return in one month or prn for wound evaluation.  BENIGN LESIONS DISCUSSED WITH PATIENT:  I discussed the specifics of tumor, prognosis, and genetics of benign lesions.  I explained that treatment of these lesions would be purely cosmetic and not medically neccessary.  I discussed with patient different removal options including excision, cautery and /or laser.      Nature and genetics of benign skin lesions dicussed with patient.  Signs and Symptoms of skin cancer discussed with patient.  Patient encouraged to perform monthly skin exams.  UV precautions reviewed with patient.  Patient to follow up with Primary Care provider regarding elevated blood pressure.  Skin care regimen reviewed with patient: Eliminate harsh soaps, i.e. Dial, zest, irsih spring; Mild soaps such as Cetaphil or  Dove sensitive skin, avoid hot or cold showers, aggressive use of emollients including vanicream, cetaphil or cerave discussed with patient.    Risks of non-melanoma skin cancer discussed with patient   Return to clinic 6 months      Again, thank you for allowing me to participate in the care of your patient.        Sincerely,        Tonio Wesley MD

## 2019-05-21 NOTE — NURSING NOTE
"Initial BP (!) 128/99   Pulse 66   SpO2 95%  Estimated body mass index is 31.9 kg/m  as calculated from the following:    Height as of 1/29/19: 1.753 m (5' 9\").    Weight as of 1/29/19: 98 kg (216 lb). .    Alem Segal LPN    "

## 2019-05-21 NOTE — PATIENT INSTRUCTIONS
Open Wound Care     for ____right sideburn__________    ? No strenuous activity for 48 hours    ? Take Tylenol as needed for discomfort.                                                .         ? Do not drink alcoholic beverages for 48 hours.    ? Keep the pressure bandage in place for 24 hours. If the bandage becomes blood tinged or loose, reinforce it with gauze and tape.        (Refer to the reverse side of this page for management of bleeding).    ? Remove bandage in 24 hours and begin wound care as follows:     1. Clean area with tap water using a Q tip or gauze pad, (shower / bathe normally)  2. Dry wound with Q tip or gauze pad  3. Apply Aquaphor, Vaseline, Polysporin or Bacitracin Ointment with a Q tip    Do NOT use Neosporin Ointment *  4. Cover the wound with a band-aid or nonstick gauze pad and paper tape.  5. Repeat wound care once a day until wound is completely healed.    It is an old wives tale that a wound heals better when it is exposed to air and allowed to dry out. The wound will heal faster with a better cosmetic result if it is kept moist with ointment and covered with a bandage.  Do not let the wound dry out.      Supplies Needed:                Qtips or gauze pads                Polysporin or Bacitracin Ointment                Bandaids or nonstick gauze pads and paper tape    Wound care kits and brown paper tape are available for purchase at   the pharmacy.    BLEEDIN. Use tightly rolled up gauze or cloth to apply direct pressure over the bandage for 20   minutes.  2. Reapply pressure for an additional 20 minutes if necessary  3. Call the office or go to the nearest emergency room if pressure fails to stop the bleeding.  4. Use additional gauze and tape to maintain pressure once the bleeding has stopped.  5. Begin wound care 24 hours after surgery as directed.                  WOUND HEALING    1. One week after surgery a pink / red halo will form around the outside of the wound.   This  is new skin.  2. The center of the wound will appear yellowish white and produce some drainage.  3. The pink halo will slowly migrate in toward the center of the wound until the wound is covered with new shiny pink skin.  4. There will be no more drainage when the wound is completely healed.  5. It will take six months to one year for the redness to fade.  6. The scar may be itchy, tight and sensitive to extreme temperatures for a year after the surgery.  7. Massaging the area several times a day for several minutes after the wound is completely healed will help the scar soften and normalize faster. Begin massage only after healing is complete.      In case of emergency call: Dr Wesley: [phone]     [facility]: [phone]    [facility]: [phone]

## 2019-05-21 NOTE — PROGRESS NOTES
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   .  Patient states this has been present for a while.  Patient reports the following symptoms:  Not healing.  Patient reports the following previous treatments cryo .  Patient reports the following modifying factors none.  Associated symptoms: none.  Patient has no other skin complaints today.  Remainder of the HPI, Meds, PMH, Allergies, FH, and SH was reviewed in chart.      Past Medical History:   Diagnosis Date     Hyperlipidemia      Hypertension      Hypogonadism male      Psoriasis      Squamous cell carcinoma      Type II diabetes mellitus (H)        Past Surgical History:   Procedure Laterality Date     HERNIA REPAIR, UMBILICAL          Family History   Problem Relation Age of Onset     Breast Cancer Mother      Coronary Artery Disease Father         virus in heart     Coronary Artery Disease Sister      Breast Cancer Sister      Melanoma No family hx of        Social History     Socioeconomic History     Marital status: Single     Spouse name: Not on file     Number of children: Not on file     Years of education: Not on file     Highest education level: Not on file   Occupational History     Not on file   Social Needs     Financial resource strain: Not on file     Food insecurity:     Worry: Not on file     Inability: Not on file     Transportation needs:     Medical: Not on file     Non-medical: Not on file   Tobacco Use     Smoking status: Never Smoker     Smokeless tobacco: Never Used   Substance and Sexual Activity     Alcohol use: Yes     Comment: 3 per week     Drug use: No     Sexual activity: Not Currently     Partners: Female   Lifestyle     Physical activity:     Days per week: Not on file     Minutes per session: Not on file     Stress: Not on file   Relationships     Social connections:     Talks on phone: Not on file     Gets together: Not on file      Attends Scientologist service: Not on file     Active member of club or organization: Not on file     Attends meetings of clubs or organizations: Not on file     Relationship status: Not on file     Intimate partner violence:     Fear of current or ex partner: Not on file     Emotionally abused: Not on file     Physically abused: Not on file     Forced sexual activity: Not on file   Other Topics Concern     Not on file   Social History Narrative     Not on file       Outpatient Encounter Medications as of 5/21/2019   Medication Sig Dispense Refill     amLODIPine (NORVASC) 5 MG tablet Take 5 mg by mouth daily  1     atorvastatin (LIPITOR) 80 MG tablet Take 80 mg by mouth daily  0     clobetasol (TEMOVATE) 0.05 % external ointment Apply topically 2 times daily 30 g 3     clobetasol (TEMOVATE) 0.05 % external solution Apply topically 2 times daily 50 mL 3     metFORMIN (GLUCOPHAGE) 1000 MG tablet Take 1,000 mg by mouth daily (with breakfast)  0     No facility-administered encounter medications on file as of 5/21/2019.              Review Of Systems  Skin: As above  Eyes: negative  Ears/Nose/Throat: negative  Respiratory: No shortness of breath, dyspnea on exertion, cough, or hemoptysis  Cardiovascular: negative  Gastrointestinal: negative  Genitourinary: negative  Musculoskeletal: negative  Neurologic: negative  Psychiatric: negative  Hematologic/Lymphatic/Immunologic: negative  Endocrine: negative      O:   NAD, WDWN, Alert & Oriented, Mood & Affect wnl, Vitals stable   Here today alone   BP (!) 128/99   Pulse 66   SpO2 95%    General appearance normal   Vitals stable   Alert, oriented and in no acute distress      Following lymph nodes palpated: Occipital, Cervical, Supraclavicular no lad   R sideburn ill-deifned 1cm red scaly plaque        Eyes: Conjunctivae/lids:Normal     ENT: Lips, buccal mucosa, tongue: normal    MSK:Normal    Cardiovascular: peripheral edema none    Pulm: Breathing Normal    Lymph Nodes: No Head  and Neck Lymphadenopathy     Neuro/Psych: Orientation:Normal; Mood/Affect:Normal      A/P:  1. R sideburn squamous cell carcinoma in situ  MOHS:   Location    After PGACAC discussed with patient, decision for Mohs surgery was made. Indication for Mohs was Location. Patient confirmed the site with Dr. Wesley.  After anesthesia with [facility], the tumor was excised using standard Mohs technique in 2 stages(s).  CLEAR MARGINS OBTAINED and Final defect size was 1.5 x 1.7 cm.     Squamous cell carcinoma on first stage  /REPAIR SECOND INTENT: We discussed the options for wound management in full with the patient including risks/benefits/possible outcomes. Decision made to allow the wound to heal by second intention. EBL minimal; complications none; wound care routine.  The patient was discharged in good condition and will return in one month or prn for wound evaluation.  BENIGN LESIONS DISCUSSED WITH PATIENT:  I discussed the specifics of tumor, prognosis, and genetics of benign lesions.  I explained that treatment of these lesions would be purely cosmetic and not medically neccessary.  I discussed with patient different removal options including excision, cautery and /or laser.      Nature and genetics of benign skin lesions dicussed with patient.  Signs and Symptoms of skin cancer discussed with patient.  Patient encouraged to perform monthly skin exams.  UV precautions reviewed with patient.  Patient to follow up with Primary Care provider regarding elevated blood pressure.  Skin care regimen reviewed with patient: Eliminate harsh soaps, i.e. Dial, zest, irsih spring; Mild soaps such as Cetaphil or Dove sensitive skin, avoid hot or cold showers, aggressive use of emollients including vanicream, cetaphil or cerave discussed with patient.    Risks of non-melanoma skin cancer discussed with patient   Return to clinic 6 months

## 2019-07-10 ENCOUNTER — TELEPHONE (OUTPATIENT)
Dept: FAMILY MEDICINE | Facility: CLINIC | Age: 59
End: 2019-07-10

## 2019-07-10 NOTE — TELEPHONE ENCOUNTER
Panel Management Review      Patient has the following on his problem list:     Diabetes    ASA: Failed    Last A1C  Lab Results   Component Value Date    A1C 13.6 01/29/2019    A1C 9.8 08/23/2018    A1C 7.9 12/20/2017    A1C 7.9 09/18/2017     A1C tested: FAILED    Last LDL:    Lab Results   Component Value Date    CHOL 190 01/29/2019     Lab Results   Component Value Date    HDL 44 01/29/2019     Lab Results   Component Value Date    LDL 99 01/29/2019     Lab Results   Component Value Date    TRIG 234 01/29/2019     No results found for: CHOLHDLRATIO  Lab Results   Component Value Date    NHDL 146 01/29/2019       Is the patient on a Statin? YES             Is the patient on Aspirin? NO    Medications     HMG CoA Reductase Inhibitors     atorvastatin (LIPITOR) 80 MG tablet             Last three blood pressure readings:  BP Readings from Last 3 Encounters:   05/21/19 (!) 128/99   05/08/19 133/88   01/29/19 120/80       Date of last diabetes office visit: 1/29/2019     Tobacco History:     History   Smoking Status     Never Smoker   Smokeless Tobacco     Never Used           Composite cancer screening  Chart review shows that this patient is due/due soon for the following Colonoscopy and Fecal Colorectal (FIT)  Summary:    Patient is due/failing the following:   A1C, ASA, COLONOSCOPY, FOLLOW UP and FIT    Action needed:   Patient needs office visit for diabetes and colon cancer screening.    Type of outreach:    Phone, left message for patient to call back. Left message to call and schedule appointment.    Questions for provider review:    None                                                                                                                                    Neha Palm MA

## 2019-07-23 ENCOUNTER — OFFICE VISIT (OUTPATIENT)
Dept: FAMILY MEDICINE | Facility: CLINIC | Age: 59
End: 2019-07-23
Payer: COMMERCIAL

## 2019-07-23 VITALS
HEIGHT: 69 IN | RESPIRATION RATE: 18 BRPM | OXYGEN SATURATION: 98 % | BODY MASS INDEX: 31.99 KG/M2 | WEIGHT: 216 LBS | DIASTOLIC BLOOD PRESSURE: 78 MMHG | HEART RATE: 82 BPM | TEMPERATURE: 98.3 F | SYSTOLIC BLOOD PRESSURE: 134 MMHG

## 2019-07-23 DIAGNOSIS — E11.9 TYPE 2 DIABETES MELLITUS WITHOUT COMPLICATION, WITHOUT LONG-TERM CURRENT USE OF INSULIN (H): Primary | ICD-10-CM

## 2019-07-23 DIAGNOSIS — E78.5 HYPERLIPIDEMIA, UNSPECIFIED HYPERLIPIDEMIA TYPE: ICD-10-CM

## 2019-07-23 LAB
ANION GAP SERPL CALCULATED.3IONS-SCNC: 6 MMOL/L (ref 3–14)
BUN SERPL-MCNC: 13 MG/DL (ref 7–30)
CALCIUM SERPL-MCNC: 8.8 MG/DL (ref 8.5–10.1)
CHLORIDE SERPL-SCNC: 105 MMOL/L (ref 94–109)
CO2 SERPL-SCNC: 27 MMOL/L (ref 20–32)
CREAT SERPL-MCNC: 0.84 MG/DL (ref 0.66–1.25)
GFR SERPL CREATININE-BSD FRML MDRD: >90 ML/MIN/{1.73_M2}
GLUCOSE SERPL-MCNC: 172 MG/DL (ref 70–99)
HBA1C MFR BLD: 10 % (ref 0–5.6)
POTASSIUM SERPL-SCNC: 4 MMOL/L (ref 3.4–5.3)
SODIUM SERPL-SCNC: 138 MMOL/L (ref 133–144)

## 2019-07-23 PROCEDURE — 83036 HEMOGLOBIN GLYCOSYLATED A1C: CPT | Performed by: NURSE PRACTITIONER

## 2019-07-23 PROCEDURE — 99214 OFFICE O/P EST MOD 30 MIN: CPT | Performed by: NURSE PRACTITIONER

## 2019-07-23 PROCEDURE — 36415 COLL VENOUS BLD VENIPUNCTURE: CPT | Performed by: NURSE PRACTITIONER

## 2019-07-23 PROCEDURE — 80048 BASIC METABOLIC PNL TOTAL CA: CPT | Performed by: NURSE PRACTITIONER

## 2019-07-23 RX ORDER — ATORVASTATIN CALCIUM 80 MG/1
80 TABLET, FILM COATED ORAL DAILY
Qty: 90 TABLET | Refills: 1 | Status: SHIPPED | OUTPATIENT
Start: 2019-07-23

## 2019-07-23 ASSESSMENT — MIFFLIN-ST. JEOR: SCORE: 1775.15

## 2019-07-23 NOTE — PROGRESS NOTES
Subjective     Remigio Almaraz is a 59 year old male who presents to clinic today for the following health issues:    HPI   Diabetes Follow-up      How often are you checking your blood sugar? Not at all    What time of day are you checking your blood sugars (select all that apply)? N/A    Have you had any blood sugars above 200?  No    Have you had any blood sugars below 70?  No    What symptoms do you notice when your blood sugar is low?  Lethargy    What concerns do you have today about your diabetes? None     Do you have any of these symptoms? (Select all that apply)  Numbness in feet     Have you had a diabetic eye exam in the last 12 months? No    Diabetes Management Resources    Hyperlipidemia Follow-Up      Are you having any of the following symptoms? (Select all that apply)  No complaints of shortness of breath, chest pain or pressure.  No increased sweating or nausea with activity.  No left-sided neck or arm pain.  No complaints of pain in calves when walking 1-2 blocks.    Are you regularly taking any medication or supplement to lower your cholesterol?  yes    Are you having muscle aches or other side effects that you think could be caused by your cholesterol lowering medication?  No    Hypertension Follow-up      Do you check your blood pressure regularly outside of the clinic? No     Are you following a low salt diet? Yes    Are your blood pressures ever more than 140 on the top number (systolic) OR more   than 90 on the bottom number (diastolic), for example 140/90? No    BP Readings from Last 2 Encounters:   07/23/19 134/78   05/21/19 (!) 128/99     Hemoglobin A1C (%)   Date Value   07/23/2019 10.0 (H)   01/29/2019 13.6 (H)     LDL Cholesterol Calculated (mg/dL)   Date Value   01/29/2019 99   08/23/2018 207 (H)       Amount of exercise or physical activity: 2-3 days/week for an average of 15-30 minutes    Problems taking medications regularly: No    Medication side effects: none    Diet: low salt and  carbohydrate counting          Patient Active Problem List   Diagnosis     Type II diabetes mellitus (H)     Psoriasis     Hypogonadism male     Hypertension     Hyperlipidemia     Past Surgical History:   Procedure Laterality Date     HERNIA REPAIR, UMBILICAL         Social History     Tobacco Use     Smoking status: Never Smoker     Smokeless tobacco: Never Used   Substance Use Topics     Alcohol use: Yes     Comment: 3 per week     Family History   Problem Relation Age of Onset     Breast Cancer Mother      Coronary Artery Disease Father         virus in heart     Coronary Artery Disease Sister      Breast Cancer Sister      Melanoma No family hx of          Current Outpatient Medications   Medication Sig Dispense Refill     amLODIPine (NORVASC) 5 MG tablet TAKE 1 TABLET(5 MG) BY MOUTH EVERY DAY 90 tablet 0     atorvastatin (LIPITOR) 80 MG tablet Take 1 tablet (80 mg) by mouth daily 90 tablet 1     clobetasol (TEMOVATE) 0.05 % external ointment Apply topically 2 times daily 30 g 3     clobetasol (TEMOVATE) 0.05 % external solution Apply topically 2 times daily 50 mL 3     metFORMIN (GLUCOPHAGE) 1000 MG tablet Take 1 tablet (1,000 mg) by mouth daily (with breakfast) 90 tablet 1     No Known Allergies  Recent Labs   Lab Test 07/23/19  1414 01/29/19  0938 08/23/18 09/18/17   A1C 10.0* 13.6* 9.8*   < > 7.9*   LDL  --  99 207*  --  190*   HDL  --  44 57  --  61   TRIG  --  234* 199*  --  233*   ALT  --  77*  --   --  19   CR  --  0.75  --   --  0.90   GFRESTIMATED  --  >90  --   --  >60   GFRESTBLACK  --  >90  --   --  >60   POTASSIUM  --  4.3  --   --  4.2   TSH  --  2.78  --   --   --     < > = values in this interval not displayed.      BP Readings from Last 3 Encounters:   07/23/19 134/78   05/21/19 (!) 128/99   05/08/19 133/88    Wt Readings from Last 3 Encounters:   07/23/19 98 kg (216 lb)   01/29/19 98 kg (216 lb)                    Reviewed and updated as needed this visit by Provider         Review of  "Systems   ROS COMP: Constitutional, HEENT, cardiovascular, pulmonary, gi and gu systems are negative, except as otherwise noted.      Objective    /78   Pulse 82   Temp 98.3  F (36.8  C)   Resp 18   Ht 1.753 m (5' 9\")   Wt 98 kg (216 lb)   SpO2 98%   BMI 31.90 kg/m    Body mass index is 31.9 kg/m .  Physical Exam   GENERAL: healthy, alert and no distress  NECK: no asymmetry  RESP: lungs clear   CV: regular rate and rhythm  MS: no gross musculoskeletal defects noted      Diagnostic Test Results:  Labs reviewed in Epic  Results for orders placed or performed in visit on 07/23/19 (from the past 24 hour(s))   Hemoglobin A1c   Result Value Ref Range    Hemoglobin A1C 10.0 (H) 0 - 5.6 %           Assessment & Plan     1. Type 2 diabetes mellitus without complication, without long-term current use of insulin (H)    - metFORMIN (GLUCOPHAGE) 1000 MG tablet; Take 1 tablet (1,000 mg) by mouth daily (with breakfast)  Dispense: 90 tablet; Refill: 1  - Hemoglobin A1c  - Basic metabolic panel  Non compliant and not well controlled. Reviewed concerns and he is refusing diabetes education, medication adjustment. He basically got up and walked out of office.    2. Hyperlipidemia, unspecified hyperlipidemia type    - atorvastatin (LIPITOR) 80 MG tablet; Take 1 tablet (80 mg) by mouth daily  Dispense: 90 tablet; Refill: 1     He asked for phentermine for weight loss and reviewed I will not fill that with uncontrolled diabetes.    BMI:   Estimated body mass index is 31.9 kg/m  as calculated from the following:    Height as of this encounter: 1.753 m (5' 9\").    Weight as of this encounter: 98 kg (216 lb).           See Patient Instructions  Patient Instructions   Continue with medications and diabetic diet.  I recommend you see diabetes education.  Follow up in 3 months.      Our Clinic hours are:  Mondays    7:20 am - 7 pm  Tues -  Fri  7:20 am - 5 pm    Clinic Phone: [phone]    The clinic lab opens at 7:30 am Mon - " Fri and appointments are required.    [facility]  Ph. [phone]  Monday  8 am - 7pm  Tues - Fri 8 am - 5:30 pm             Return in about 3 months (around 10/23/2019) for or sooner if symptoms persist or worsen, Med Check, Lab Work.    [facility]

## 2019-07-23 NOTE — PATIENT INSTRUCTIONS
Continue with medications and diabetic diet.  I recommend you see diabetes education.  Follow up in 3 months.      Our Clinic hours are:  Mondays    7:20 am - 7 pm  Tues -  Fri  7:20 am - 5 pm    Clinic Phone: [phone]    The clinic lab opens at 7:30 am Mon - Fri and appointments are required.    [facility]  Ph. [phone]  Monday  8 am - 7pm  Tues - Fri 8 am - 5:30 pm

## 2019-07-23 NOTE — LETTER
[facility]  [address]  [facility] [address]  Phone: [phone]      7/24/2019     Remigio Almaraz  [facility] [address]      Dear Remigio:    Thank you for allowing me to participate in your care. Your recent test results were reviewed and listed below.  Labs stable other than elevated glucose.   I recommend diabetes education.   Recheck labs in 3 months.     Your results are provided below for your review  Results for orders placed or performed in visit on 07/23/19   Hemoglobin A1c   Result Value Ref Range    Hemoglobin A1C 10.0 (H) 0 - 5.6 %   Basic metabolic panel   Result Value Ref Range    Sodium 138 133 - 144 mmol/L    Potassium 4.0 3.4 - 5.3 mmol/L    Chloride 105 94 - 109 mmol/L    Carbon Dioxide 27 20 - 32 mmol/L    Anion Gap 6 3 - 14 mmol/L    Glucose 172 (H) 70 - 99 mg/dL    Urea Nitrogen 13 7 - 30 mg/dL    Creatinine 0.84 0.66 - 1.25 mg/dL    GFR Estimate >90 >60 mL/min/[1.73_m2]    GFR Estimate If Black >90 >60 mL/min/[1.73_m2]    Calcium 8.8 8.5 - 10.1 mg/dL             Thank you for choosing [facility]. As a result, please continue with the treatment plan discussed in the office. Return as discussed or sooner if symptoms worsen or fail to improve.     If you have any further questions or concerns, please do not hesitate to contact us.      Sincerely,        ASHLEY Mendoza/CORBY

## 2020-07-06 ENCOUNTER — OFFICE VISIT (OUTPATIENT)
Dept: DERMATOLOGY | Facility: CLINIC | Age: 60
End: 2020-07-06
Payer: COMMERCIAL

## 2020-07-06 VITALS — HEIGHT: 71 IN | BODY MASS INDEX: 30.13 KG/M2 | HEART RATE: 76 BPM | OXYGEN SATURATION: 98 %

## 2020-07-06 DIAGNOSIS — D18.01 ANGIOMA OF SKIN: ICD-10-CM

## 2020-07-06 DIAGNOSIS — L82.1 SEBORRHEIC KERATOSIS: ICD-10-CM

## 2020-07-06 DIAGNOSIS — D23.9 DERMAL NEVUS: ICD-10-CM

## 2020-07-06 DIAGNOSIS — Z85.828 HISTORY OF SKIN CANCER: Primary | ICD-10-CM

## 2020-07-06 DIAGNOSIS — L81.4 LENTIGO: ICD-10-CM

## 2020-07-06 PROCEDURE — 11900 INJECT SKIN LESIONS </W 7: CPT | Performed by: DERMATOLOGY

## 2020-07-06 PROCEDURE — 99213 OFFICE O/P EST LOW 20 MIN: CPT | Mod: 25 | Performed by: DERMATOLOGY

## 2020-07-06 NOTE — PROGRESS NOTES
Remigio Almaraz is a 60 year old year old male patient here today for brown spots on face.   .  Patient states this has been present for a while.  Patient reports the following symptoms:  none.  Patient reports the following previous treatments none.  These treatments did not work.  Patient reports the following modifying factors none.  Associated symptoms: none.  HE also would like IL TAC to L knee and elbow and scalp for psoriasis.  .  Patient has no other skin complaints today.  Remainder of the HPI, Meds, PMH, Allergies, FH, and SH was reviewed in chart.      Past Medical History:   Diagnosis Date     Hyperlipidemia      Hypertension      Hypogonadism male      Psoriasis      Squamous cell carcinoma      Type II diabetes mellitus (H)        Past Surgical History:   Procedure Laterality Date     HERNIA REPAIR, UMBILICAL          Family History   Problem Relation Age of Onset     Breast Cancer Mother      Coronary Artery Disease Father         virus in heart     Coronary Artery Disease Sister      Breast Cancer Sister      Melanoma No family hx of        Social History     Socioeconomic History     Marital status: Single     Spouse name: Not on file     Number of children: Not on file     Years of education: Not on file     Highest education level: Not on file   Occupational History     Not on file   Social Needs     Financial resource strain: Not on file     Food insecurity     Worry: Not on file     Inability: Not on file     Transportation needs     Medical: Not on file     Non-medical: Not on file   Tobacco Use     Smoking status: Never Smoker     Smokeless tobacco: Never Used   Substance and Sexual Activity     Alcohol use: Yes     Comment: 3 per week     Drug use: No     Sexual activity: Not Currently     Partners: Female   Lifestyle     Physical activity     Days per week: Not on file     Minutes per session: Not on file     Stress: Not on file   Relationships     Social connections     Talks on phone: Not on  file     Gets together: Not on file     Attends Shinto service: Not on file     Active member of club or organization: Not on file     Attends meetings of clubs or organizations: Not on file     Relationship status: Not on file     Intimate partner violence     Fear of current or ex partner: Not on file     Emotionally abused: Not on file     Physically abused: Not on file     Forced sexual activity: Not on file   Other Topics Concern     Not on file   Social History Narrative     Not on file       Outpatient Encounter Medications as of 7/6/2020   Medication Sig Dispense Refill     amLODIPine (NORVASC) 5 MG tablet TAKE 1 TABLET(5 MG) BY MOUTH EVERY DAY 90 tablet 0     atorvastatin (LIPITOR) 80 MG tablet Take 1 tablet (80 mg) by mouth daily 90 tablet 1     clobetasol (TEMOVATE) 0.05 % external ointment APPLY TO THE AFFECTED AREA TWICE DAILY 30 g 3     clobetasol (TEMOVATE) 0.05 % external solution Apply topically 2 times daily 50 mL 3     metFORMIN (GLUCOPHAGE) 1000 MG tablet Take 1 tablet (1,000 mg) by mouth daily (with breakfast) 90 tablet 1     No facility-administered encounter medications on file as of 7/6/2020.              Review Of Systems  Skin: As above  Eyes: negative  Ears/Nose/Throat: negative  Respiratory: No shortness of breath, dyspnea on exertion, cough, or hemoptysis  Cardiovascular: negative  Gastrointestinal: negative  Genitourinary: negative  Musculoskeletal: negative  Neurologic: negative  Psychiatric: negative  Hematologic/Lymphatic/Immunologic: negative  Endocrine: negative      O:   NAD, WDWN, Alert & Oriented, Mood & Affect wnl, Vitals stable   Here today alone   There were no vitals taken for this visit.   General appearance normal   Vitals stable   Alert, oriented and in no acute distress      Following lymph nodes palpated: Occipital, Cervical, Supraclavicular no lad   Stuck on appules on face   L knee, elbow and L PA scalp isolated red scaly palques     Stuck on papules and brown  macules on trunk and ext   Red papules on trunk  Flesh colored papules on trunk     The remainder of expanded problem focused exam was normal; the following areas were examined:  scalp/hair, conjunctiva/lids, face, neck, lips, chest, ab, back , chest, digits/nails, RUE, LUE.      Eyes: Conjunctivae/lids:Normal     ENT: Lips, buccal mucosa, tongue: normal    MSK:Normal    Cardiovascular: peripheral edema none    Pulm: Breathing Normal    Lymph Nodes: No Head and Neck Lymphadenopathy     Neuro/Psych: Orientation:Alert and Orientedx3 ; Mood/Affect:normal       A/P:  1. Seborrheic keratosis, lentigo, angioma, dermal nevus  2. Psoriasis  IL TAC: PGACAC discussed.  Risks including but not limited to injection site reaction, bruising, no resolution.  All questions answered and entertained to patient s satisfaction.  Informed consent obtained.  IL TAC in concentration of 5mg/ml was injected ID to L knee, L elbow, L scalp.   Total injected was  1 ml.  Patient tolerated without complications and given wound care instructions, including not to move product around.    [id number]  Sep 2021    BENIGN LESIONS DISCUSSED WITH PATIENT:  I discussed the specifics of tumor, prognosis, and genetics of benign lesions.  I explained that treatment of these lesions would be purely cosmetic and not medically neccessary.  I discussed with patient different removal options including excision, cautery and /or laser.      Nature and genetics of benign skin lesions dicussed with patient.  Signs and Symptoms of skin cancer discussed with patient.  ABCDEs of melanoma reviewed with patient.  Patient encouraged to perform monthly skin exams.  UV precautions reviewed with patient.  Skin care regimen reviewed with patient: Eliminate harsh soaps, i.e. Dial, zest, irsih spring; Mild soaps such as Cetaphil or Dove sensitive skin, avoid hot or cold showers, aggressive use of emollients including vanicream, cetaphil or cerave discussed with patient.    Risks  of non-melanoma skin cancer discussed with patient   Return to clinic 12 months

## 2020-07-06 NOTE — NURSING NOTE
"Chief Complaint   Patient presents with     Psoriasis     Skin Check       Vitals:    07/06/20 1322   Pulse: 76   SpO2: 98%   Height: 1.803 m (5' 11\")     Wt Readings from Last 1 Encounters:   07/23/19 98 kg (216 lb)       Mely Cho LPN.................7/6/2020    "

## 2020-07-06 NOTE — LETTER
7/6/2020         RE: Remigio Almaraz  [facility] [address]        Dear Colleague,    Thank you for referring your patient, Remigio Almaraz, to the [facility]. Please see a copy of my visit note below.    Remigio Almaraz is a 60 year old year old male patient here today for brown spots on face.   .  Patient states this has been present for a while.  Patient reports the following symptoms:  none.  Patient reports the following previous treatments none.  These treatments did not work.  Patient reports the following modifying factors none.  Associated symptoms: none.  HE also would like IL TAC to L knee and elbow and scalp for psoriasis.  .  Patient has no other skin complaints today.  Remainder of the HPI, Meds, PMH, Allergies, FH, and SH was reviewed in chart.      Past Medical History:   Diagnosis Date     Hyperlipidemia      Hypertension      Hypogonadism male      Psoriasis      Squamous cell carcinoma      Type II diabetes mellitus (H)        Past Surgical History:   Procedure Laterality Date     HERNIA REPAIR, UMBILICAL          Family History   Problem Relation Age of Onset     Breast Cancer Mother      Coronary Artery Disease Father         virus in heart     Coronary Artery Disease Sister      Breast Cancer Sister      Melanoma No family hx of        Social History     Socioeconomic History     Marital status: Single     Spouse name: Not on file     Number of children: Not on file     Years of education: Not on file     Highest education level: Not on file   Occupational History     Not on file   Social Needs     Financial resource strain: Not on file     Food insecurity     Worry: Not on file     Inability: Not on file     Transportation needs     Medical: Not on file     Non-medical: Not on file   Tobacco Use     Smoking status: Never Smoker     Smokeless tobacco: Never Used   Substance and Sexual Activity     Alcohol use: Yes     Comment: 3 per week     Drug use: No     Sexual activity:  Not Currently     Partners: Female   Lifestyle     Physical activity     Days per week: Not on file     Minutes per session: Not on file     Stress: Not on file   Relationships     Social connections     Talks on phone: Not on file     Gets together: Not on file     Attends Anabaptism service: Not on file     Active member of club or organization: Not on file     Attends meetings of clubs or organizations: Not on file     Relationship status: Not on file     Intimate partner violence     Fear of current or ex partner: Not on file     Emotionally abused: Not on file     Physically abused: Not on file     Forced sexual activity: Not on file   Other Topics Concern     Not on file   Social History Narrative     Not on file       Outpatient Encounter Medications as of 7/6/2020   Medication Sig Dispense Refill     amLODIPine (NORVASC) 5 MG tablet TAKE 1 TABLET(5 MG) BY MOUTH EVERY DAY 90 tablet 0     atorvastatin (LIPITOR) 80 MG tablet Take 1 tablet (80 mg) by mouth daily 90 tablet 1     clobetasol (TEMOVATE) 0.05 % external ointment APPLY TO THE AFFECTED AREA TWICE DAILY 30 g 3     clobetasol (TEMOVATE) 0.05 % external solution Apply topically 2 times daily 50 mL 3     metFORMIN (GLUCOPHAGE) 1000 MG tablet Take 1 tablet (1,000 mg) by mouth daily (with breakfast) 90 tablet 1     No facility-administered encounter medications on file as of 7/6/2020.              Review Of Systems  Skin: As above  Eyes: negative  Ears/Nose/Throat: negative  Respiratory: No shortness of breath, dyspnea on exertion, cough, or hemoptysis  Cardiovascular: negative  Gastrointestinal: negative  Genitourinary: negative  Musculoskeletal: negative  Neurologic: negative  Psychiatric: negative  Hematologic/Lymphatic/Immunologic: negative  Endocrine: negative      O:   NAD, WDWN, Alert & Oriented, Mood & Affect wnl, Vitals stable   Here today alone   There were no vitals taken for this visit.   General appearance normal   Vitals stable   Alert, oriented  and in no acute distress      Following lymph nodes palpated: Occipital, Cervical, Supraclavicular no lad   Stuck on appules on face   L knee, elbow and L PA scalp isolated red scaly palques     Stuck on papules and brown macules on trunk and ext   Red papules on trunk  Flesh colored papules on trunk     The remainder of expanded problem focused exam was normal; the following areas were examined:  scalp/hair, conjunctiva/lids, face, neck, lips, chest, ab, back , chest, digits/nails, RUE, LUE.      Eyes: Conjunctivae/lids:Normal     ENT: Lips, buccal mucosa, tongue: normal    MSK:Normal    Cardiovascular: peripheral edema none    Pulm: Breathing Normal    Lymph Nodes: No Head and Neck Lymphadenopathy     Neuro/Psych: Orientation:Alert and Orientedx3 ; Mood/Affect:normal       A/P:  1. Seborrheic keratosis, lentigo, angioma, dermal nevus  2. Psoriasis  IL TAC: PGACAC discussed.  Risks including but not limited to injection site reaction, bruising, no resolution.  All questions answered and entertained to patient s satisfaction.  Informed consent obtained.  IL TAC in concentration of 5mg/ml was injected ID to L knee, L elbow, L scalp.   Total injected was  1 ml.  Patient tolerated without complications and given wound care instructions, including not to move product around.    [id number]  Sep 2021    BENIGN LESIONS DISCUSSED WITH PATIENT:  I discussed the specifics of tumor, prognosis, and genetics of benign lesions.  I explained that treatment of these lesions would be purely cosmetic and not medically neccessary.  I discussed with patient different removal options including excision, cautery and /or laser.      Nature and genetics of benign skin lesions dicussed with patient.  Signs and Symptoms of skin cancer discussed with patient.  ABCDEs of melanoma reviewed with patient.  Patient encouraged to perform monthly skin exams.  UV precautions reviewed with patient.  Skin care regimen reviewed with patient: Eliminate  harsh soaps, i.e. Dial, zest, irsih spring; Mild soaps such as Cetaphil or Dove sensitive skin, avoid hot or cold showers, aggressive use of emollients including vanicream, cetaphil or cerave discussed with patient.    Risks of non-melanoma skin cancer discussed with patient   Return to clinic 12 months      Again, thank you for allowing me to participate in the care of your patient.        Sincerely,        Tonio Wesley MD

## 2023-08-15 ENCOUNTER — APPOINTMENT (OUTPATIENT)
Dept: GENERAL RADIOLOGY | Facility: CLINIC | Age: 63
End: 2023-08-15
Attending: FAMILY MEDICINE
Payer: COMMERCIAL

## 2023-08-15 ENCOUNTER — HOSPITAL ENCOUNTER (EMERGENCY)
Facility: CLINIC | Age: 63
Discharge: HOME | End: 2023-08-15
Attending: FAMILY MEDICINE | Admitting: FAMILY MEDICINE
Payer: COMMERCIAL

## 2023-08-15 VITALS
TEMPERATURE: 97 F | WEIGHT: 215 LBS | OXYGEN SATURATION: 95 % | SYSTOLIC BLOOD PRESSURE: 131 MMHG | DIASTOLIC BLOOD PRESSURE: 89 MMHG | HEART RATE: 69 BPM | BODY MASS INDEX: 29.99 KG/M2 | RESPIRATION RATE: 18 BRPM

## 2023-08-15 DIAGNOSIS — S93.401A SPRAIN OF RIGHT ANKLE, UNSPECIFIED LIGAMENT, INITIAL ENCOUNTER: ICD-10-CM

## 2023-08-15 DIAGNOSIS — S46.811A TRAPEZIUS STRAIN, RIGHT, INITIAL ENCOUNTER: ICD-10-CM

## 2023-08-15 PROCEDURE — 73030 X-RAY EXAM OF SHOULDER: CPT | Mod: RT

## 2023-08-15 PROCEDURE — 250N000013 HC RX MED GY IP 250 OP 250 PS 637: Performed by: EMERGENCY MEDICINE

## 2023-08-15 PROCEDURE — 99284 EMERGENCY DEPT VISIT MOD MDM: CPT | Performed by: FAMILY MEDICINE

## 2023-08-15 PROCEDURE — 99283 EMERGENCY DEPT VISIT LOW MDM: CPT | Performed by: FAMILY MEDICINE

## 2023-08-15 PROCEDURE — 73610 X-RAY EXAM OF ANKLE: CPT | Mod: RT

## 2023-08-15 RX ORDER — CYCLOBENZAPRINE HCL 10 MG
10 TABLET ORAL 3 TIMES DAILY PRN
Qty: 12 TABLET | Refills: 0 | Status: SHIPPED | OUTPATIENT
Start: 2023-08-15 | End: 2023-08-21

## 2023-08-15 RX ORDER — ASPIRIN 81 MG/1
81 TABLET ORAL DAILY
COMMUNITY
Start: 2022-12-28

## 2023-08-15 RX ORDER — ACETAMINOPHEN 500 MG
1000 TABLET ORAL ONCE
Status: COMPLETED | OUTPATIENT
Start: 2023-08-15 | End: 2023-08-15

## 2023-08-15 RX ADMIN — ACETAMINOPHEN 1000 MG: 500 TABLET, FILM COATED ORAL at 14:48
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
## 2023-08-15 NOTE — DISCHARGE INSTRUCTIONS
RETURN TO THE EMERGENCY ROOM FOR THE FOLLOWING:    Severely worsened pain, new trouble with breathing, new weakness unrelated to pain, or at anytime for any concern.    FOLLOW UP:    With your primary clinic in 1 to 2 weeks for reevaluation, referral order placed at the time of discharge.  Expect a phone call within the next 1-2 business days to help with scheduling.    TREATMENT RECOMMENDATIONS:    Ibuprofen 600 mg every six hours for pain (7 days duration).  Tylenol 1000 mg every six hours for pain (7 days duration).  Therefore, you can alternate these every three hours and do it safely.    Flexeril for pain not relieved by the above.    NURSE ADVICE LINE:  [phone] or [phone]
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
## 2023-08-16 ENCOUNTER — PATIENT OUTREACH (OUTPATIENT)
Dept: FAMILY MEDICINE | Facility: CLINIC | Age: 63
End: 2023-08-16
Payer: COMMERCIAL

## 2023-08-16 NOTE — TELEPHONE ENCOUNTER
What type of discharge? Emergency Department  Risk of Hospital admission or ED visit: 58%  Is a TCM episode required? No  When should the patient follow up with PCP? 7 days of discharge.    Yumiko Cervantes RN on 8/16/2023 at 9:54 AM

## 2023-08-16 NOTE — TELEPHONE ENCOUNTER
Hospital/TCU/ED for chronic condition Discharge Protocol    [facility] patient.    Jaqueline Rincon RN